# Patient Record
Sex: MALE | Race: WHITE | NOT HISPANIC OR LATINO | URBAN - METROPOLITAN AREA
[De-identification: names, ages, dates, MRNs, and addresses within clinical notes are randomized per-mention and may not be internally consistent; named-entity substitution may affect disease eponyms.]

---

## 2017-11-25 ENCOUNTER — EMERGENCY (EMERGENCY)
Facility: HOSPITAL | Age: 22
LOS: 1 days | Discharge: HOME | End: 2017-11-25
Attending: EMERGENCY MEDICINE | Admitting: EMERGENCY MEDICINE
Payer: COMMERCIAL

## 2017-11-25 VITALS
TEMPERATURE: 97 F | OXYGEN SATURATION: 100 % | RESPIRATION RATE: 16 BRPM | HEART RATE: 82 BPM | SYSTOLIC BLOOD PRESSURE: 141 MMHG | DIASTOLIC BLOOD PRESSURE: 90 MMHG

## 2017-11-25 LAB
HIV1 AG SER QL: SIGNIFICANT CHANGE UP
HIV1+2 AB SPEC QL: SIGNIFICANT CHANGE UP

## 2017-11-25 PROCEDURE — 99284 EMERGENCY DEPT VISIT MOD MDM: CPT

## 2017-11-25 NOTE — ED PROVIDER NOTE - PENIS
LESIONS/Uncircumcised, 2 small lesions on the L side of the penis. Appear to be small papules with slight scabbing on each. Minimal tenderness. No other lesions. No inguinal lymphadenopathy. Testicular exam normal. No discharge noted./uncircumcised LESIONS/circumcised, 2 small lesions on the L side of the penis. Appear to be small papules with slight scabbing on each. Minimal tenderness. No other lesions. No inguinal lymphadenopathy. Testicular exam normal. No discharge noted./circumcised

## 2017-11-25 NOTE — ED PROVIDER NOTE - OBJECTIVE STATEMENT
21y/o M with no pertinent PMHx presents to the ED c/o scabs to the penis, rhinorrhea x2d. Pt found what resembles blisters this morning on his penis. He then showered, they then appeared as scabs. He visited an urgi center and they swabbed the blisters, which were painful at the time. Pt was not told anything at urgi, and presents to the ED today. He has not had an STD in the past. Pt has not had a sexual encounter in 4mo. He reports all instances of past sexual intercourse have been protected. Denies discharge, dysuria, burning urination, injury, any other complaints. 23y/o M with no pertinent PMHx presents to the ED c/o scabs to the penis, rhinorrhea x2d. Pt found what resembled small lumps maybe pimples this morning on his penis. He then showered, they then appeared as scabs. He visited an urgi center and they swabbed the blisters, which were painful at the time. He was told to go to ER for STD testing. He has not had an STD in the past. Pt states has not had a sexual encounter in 4mo. He reports all instances of past sexual intercourse have been protected. Denies discharge, dysuria, burning urination, injury, any other complaints.

## 2017-11-25 NOTE — ED PROVIDER NOTE - ATTENDING CONTRIBUTION TO CARE
(Dr Dozier:) I performed the initial face to face bedside interview with this patient regarding history of present illness, review of symptoms and past medical, social history.  I completed an independent physical examination.  I was the initial provider who evaluated this patient.  The history, review of symptoms and examination was documented by the scribe in my presence and I attest to the accuracy of the documentation.  I have signed out the follow up of any pending tests ( if indicated, such as labs, radiological studies) to the PA.  I have discussed the patient’s plan of care and disposition with the PA.

## 2017-11-25 NOTE — ED PROVIDER NOTE - PLAN OF CARE
Follow up with your Primary Medical Doctor within 2-3days. Call [phone] for your results. If results or reports were given to you, show copies of your reports given to you. Take all of your medications as previously prescribed. If any new or concerning symptoms return to the ED.

## 2017-11-25 NOTE — ED PROVIDER NOTE - PROGRESS NOTE DETAILS
MAIRUM Abreu: Received signout and discussed plan with QUID attending. Will give patient number to call for results. Stable for d/c home with followup. Pt amenable with plan.

## 2017-11-25 NOTE — ED PROVIDER NOTE - MEDICAL DECISION MAKING DETAILS
23y/o M with 2 skin lesions on the penis. Unlikely STD based on history and appearance. Will send HIV, VDRL, urine for GC chlamydia, and herpes swab if available. 23y/o M with 2 skin lesions on the penis. Unlikely STD based on history and appearance. Will send HIV, VDRL, urine for GC chlamydia, and herpes viral swab. Will not treat with antibiotics or antivirals based on low likelihood of STD based on history and exam.

## 2017-11-25 NOTE — ED PROVIDER NOTE - SKIN, MLM
Except for lesions on penis as described, Skin normal color for race, warm, dry and intact. No evidence of rash.

## 2017-11-25 NOTE — ED PROVIDER NOTE - CARE PLAN
Principal Discharge DX:	Penile lesion  Instructions for follow-up, activity and diet:	Follow up with your Primary Medical Doctor within 2-3days. Call [phone] for your results. If results or reports were given to you, show copies of your reports given to you. Take all of your medications as previously prescribed. If any new or concerning symptoms return to the ED.

## 2017-11-26 LAB
C TRACH RRNA SPEC QL NAA+PROBE: SIGNIFICANT CHANGE UP
HSV+VZV DNA SPEC QL NAA+PROBE: SIGNIFICANT CHANGE UP
N GONORRHOEA RRNA SPEC QL NAA+PROBE: SIGNIFICANT CHANGE UP
SPECIMEN SOURCE: SIGNIFICANT CHANGE UP
SPECIMEN SOURCE: SIGNIFICANT CHANGE UP
T PALLIDUM AB TITR SER: NEGATIVE — SIGNIFICANT CHANGE UP

## 2017-11-26 NOTE — ED POST DISCHARGE NOTE - RESULT SUMMARY
Patient called for results. Discussed with patient HSV2 detected. Patient states no pain presently. Patient told to follow up with Urologist.

## 2019-04-26 PROBLEM — Z00.00 ENCOUNTER FOR PREVENTIVE HEALTH EXAMINATION: Status: ACTIVE | Noted: 2019-04-26

## 2019-05-17 ENCOUNTER — APPOINTMENT (OUTPATIENT)
Dept: GASTROENTEROLOGY | Facility: CLINIC | Age: 24
End: 2019-05-17

## 2020-05-05 ENCOUNTER — TRANSCRIPTION ENCOUNTER (OUTPATIENT)
Age: 25
End: 2020-05-05

## 2020-05-05 ENCOUNTER — APPOINTMENT (OUTPATIENT)
Dept: INTERNAL MEDICINE | Facility: CLINIC | Age: 25
End: 2020-05-05
Payer: COMMERCIAL

## 2020-05-05 ENCOUNTER — EMERGENCY (EMERGENCY)
Facility: HOSPITAL | Age: 25
LOS: 1 days | Discharge: HOME | End: 2020-05-05
Attending: EMERGENCY MEDICINE
Payer: COMMERCIAL

## 2020-05-05 VITALS
DIASTOLIC BLOOD PRESSURE: 88 MMHG | HEART RATE: 79 BPM | RESPIRATION RATE: 18 BRPM | SYSTOLIC BLOOD PRESSURE: 126 MMHG | HEIGHT: 65 IN | TEMPERATURE: 99 F | WEIGHT: 139.99 LBS | OXYGEN SATURATION: 100 %

## 2020-05-05 VITALS
SYSTOLIC BLOOD PRESSURE: 122 MMHG | OXYGEN SATURATION: 99 % | HEART RATE: 77 BPM | TEMPERATURE: 99 F | RESPIRATION RATE: 18 BRPM | DIASTOLIC BLOOD PRESSURE: 77 MMHG

## 2020-05-05 DIAGNOSIS — R50.9 FEVER, UNSPECIFIED: ICD-10-CM

## 2020-05-05 DIAGNOSIS — F12.90 CANNABIS USE, UNSPECIFIED, UNCOMPLICATED: ICD-10-CM

## 2020-05-05 DIAGNOSIS — Z72.0 TOBACCO USE: ICD-10-CM

## 2020-05-05 DIAGNOSIS — Z87.898 PERSONAL HISTORY OF OTHER SPECIFIED CONDITIONS: ICD-10-CM

## 2020-05-05 DIAGNOSIS — Z87.891 PERSONAL HISTORY OF NICOTINE DEPENDENCE: ICD-10-CM

## 2020-05-05 DIAGNOSIS — K29.00 ACUTE GASTRITIS W/OUT BLEEDING: ICD-10-CM

## 2020-05-05 LAB
ALBUMIN SERPL ELPH-MCNC: 3.9 G/DL — SIGNIFICANT CHANGE UP (ref 3.3–5)
ALP SERPL-CCNC: 70 U/L — SIGNIFICANT CHANGE UP (ref 40–120)
ALT FLD-CCNC: 11 U/L — SIGNIFICANT CHANGE UP (ref 10–45)
ANION GAP SERPL CALC-SCNC: 20 MMOL/L — HIGH (ref 5–17)
APPEARANCE UR: CLEAR — SIGNIFICANT CHANGE UP
AST SERPL-CCNC: 17 U/L — SIGNIFICANT CHANGE UP (ref 10–40)
BASE EXCESS BLDV CALC-SCNC: 2.9 MMOL/L — HIGH (ref -2–2)
BASOPHILS # BLD AUTO: 0.02 K/UL — SIGNIFICANT CHANGE UP (ref 0–0.2)
BASOPHILS NFR BLD AUTO: 0.1 % — SIGNIFICANT CHANGE UP (ref 0–2)
BILIRUB SERPL-MCNC: 1.4 MG/DL — HIGH (ref 0.2–1.2)
BILIRUB UR-MCNC: ABNORMAL
BUN SERPL-MCNC: 14 MG/DL — SIGNIFICANT CHANGE UP (ref 7–23)
CA-I SERPL-SCNC: 1.15 MMOL/L — SIGNIFICANT CHANGE UP (ref 1.12–1.3)
CALCIUM SERPL-MCNC: 9.6 MG/DL — SIGNIFICANT CHANGE UP (ref 8.4–10.5)
CHLORIDE BLDV-SCNC: 99 MMOL/L — SIGNIFICANT CHANGE UP (ref 96–108)
CHLORIDE SERPL-SCNC: 95 MMOL/L — LOW (ref 96–108)
CO2 BLDV-SCNC: 29 MMOL/L — SIGNIFICANT CHANGE UP (ref 22–30)
CO2 SERPL-SCNC: 23 MMOL/L — SIGNIFICANT CHANGE UP (ref 22–31)
COLOR SPEC: YELLOW — SIGNIFICANT CHANGE UP
CREAT SERPL-MCNC: 1.01 MG/DL — SIGNIFICANT CHANGE UP (ref 0.5–1.3)
DIFF PNL FLD: NEGATIVE — SIGNIFICANT CHANGE UP
EOSINOPHIL # BLD AUTO: 0 K/UL — SIGNIFICANT CHANGE UP (ref 0–0.5)
EOSINOPHIL NFR BLD AUTO: 0 % — SIGNIFICANT CHANGE UP (ref 0–6)
GAS PNL BLDV: 134 MMOL/L — LOW (ref 135–145)
GAS PNL BLDV: SIGNIFICANT CHANGE UP
GLUCOSE BLDV-MCNC: 101 MG/DL — HIGH (ref 70–99)
GLUCOSE SERPL-MCNC: 105 MG/DL — HIGH (ref 70–99)
GLUCOSE UR QL: NEGATIVE — SIGNIFICANT CHANGE UP
HCO3 BLDV-SCNC: 27 MMOL/L — SIGNIFICANT CHANGE UP (ref 21–29)
HCT VFR BLD CALC: 37.8 % — LOW (ref 39–50)
HCT VFR BLDA CALC: 42 % — SIGNIFICANT CHANGE UP (ref 39–50)
HGB BLD CALC-MCNC: 13.8 G/DL — SIGNIFICANT CHANGE UP (ref 13–17)
HGB BLD-MCNC: 13.1 G/DL — SIGNIFICANT CHANGE UP (ref 13–17)
IMM GRANULOCYTES NFR BLD AUTO: 0.5 % — SIGNIFICANT CHANGE UP (ref 0–1.5)
KETONES UR-MCNC: ABNORMAL
LACTATE BLDV-MCNC: 1.6 MMOL/L — SIGNIFICANT CHANGE UP (ref 0.7–2)
LEUKOCYTE ESTERASE UR-ACNC: NEGATIVE — SIGNIFICANT CHANGE UP
LIDOCAIN IGE QN: 8 U/L — SIGNIFICANT CHANGE UP (ref 7–60)
LYMPHOCYTES # BLD AUTO: 1.05 K/UL — SIGNIFICANT CHANGE UP (ref 1–3.3)
LYMPHOCYTES # BLD AUTO: 6.7 % — LOW (ref 13–44)
MCHC RBC-ENTMCNC: 29 PG — SIGNIFICANT CHANGE UP (ref 27–34)
MCHC RBC-ENTMCNC: 34.7 GM/DL — SIGNIFICANT CHANGE UP (ref 32–36)
MCV RBC AUTO: 83.8 FL — SIGNIFICANT CHANGE UP (ref 80–100)
MONOCYTES # BLD AUTO: 0.38 K/UL — SIGNIFICANT CHANGE UP (ref 0–0.9)
MONOCYTES NFR BLD AUTO: 2.4 % — SIGNIFICANT CHANGE UP (ref 2–14)
NEUTROPHILS # BLD AUTO: 14.22 K/UL — HIGH (ref 1.8–7.4)
NEUTROPHILS NFR BLD AUTO: 90.3 % — HIGH (ref 43–77)
NITRITE UR-MCNC: NEGATIVE — SIGNIFICANT CHANGE UP
NRBC # BLD: 0 /100 WBCS — SIGNIFICANT CHANGE UP (ref 0–0)
PCO2 BLDV: 44 MMHG — SIGNIFICANT CHANGE UP (ref 35–50)
PH BLDV: 7.41 — SIGNIFICANT CHANGE UP (ref 7.35–7.45)
PH UR: 6.5 — SIGNIFICANT CHANGE UP (ref 5–8)
PLATELET # BLD AUTO: 422 K/UL — HIGH (ref 150–400)
PO2 BLDV: 40 MMHG — SIGNIFICANT CHANGE UP (ref 25–45)
POTASSIUM BLDV-SCNC: 3.6 MMOL/L — SIGNIFICANT CHANGE UP (ref 3.5–5.3)
POTASSIUM SERPL-MCNC: 3.8 MMOL/L — SIGNIFICANT CHANGE UP (ref 3.5–5.3)
POTASSIUM SERPL-SCNC: 3.8 MMOL/L — SIGNIFICANT CHANGE UP (ref 3.5–5.3)
PROT SERPL-MCNC: 8.3 G/DL — SIGNIFICANT CHANGE UP (ref 6–8.3)
PROT UR-MCNC: 100 — SIGNIFICANT CHANGE UP
RBC # BLD: 4.51 M/UL — SIGNIFICANT CHANGE UP (ref 4.2–5.8)
RBC # FLD: 11.1 % — SIGNIFICANT CHANGE UP (ref 10.3–14.5)
SAO2 % BLDV: 72 % — SIGNIFICANT CHANGE UP (ref 67–88)
SODIUM SERPL-SCNC: 138 MMOL/L — SIGNIFICANT CHANGE UP (ref 135–145)
SP GR SPEC: 1.04 — HIGH (ref 1.01–1.02)
UROBILINOGEN FLD QL: ABNORMAL
WBC # BLD: 15.75 K/UL — HIGH (ref 3.8–10.5)
WBC # FLD AUTO: 15.75 K/UL — HIGH (ref 3.8–10.5)

## 2020-05-05 PROCEDURE — 85027 COMPLETE CBC AUTOMATED: CPT

## 2020-05-05 PROCEDURE — 85014 HEMATOCRIT: CPT

## 2020-05-05 PROCEDURE — 82947 ASSAY GLUCOSE BLOOD QUANT: CPT

## 2020-05-05 PROCEDURE — 84132 ASSAY OF SERUM POTASSIUM: CPT

## 2020-05-05 PROCEDURE — 82330 ASSAY OF CALCIUM: CPT

## 2020-05-05 PROCEDURE — 82435 ASSAY OF BLOOD CHLORIDE: CPT

## 2020-05-05 PROCEDURE — 99284 EMERGENCY DEPT VISIT MOD MDM: CPT | Mod: 25

## 2020-05-05 PROCEDURE — 83690 ASSAY OF LIPASE: CPT

## 2020-05-05 PROCEDURE — 71045 X-RAY EXAM CHEST 1 VIEW: CPT | Mod: 26

## 2020-05-05 PROCEDURE — 82803 BLOOD GASES ANY COMBINATION: CPT

## 2020-05-05 PROCEDURE — 99204 OFFICE O/P NEW MOD 45 MIN: CPT | Mod: 95

## 2020-05-05 PROCEDURE — 99284 EMERGENCY DEPT VISIT MOD MDM: CPT

## 2020-05-05 PROCEDURE — 96374 THER/PROPH/DIAG INJ IV PUSH: CPT

## 2020-05-05 PROCEDURE — 96361 HYDRATE IV INFUSION ADD-ON: CPT

## 2020-05-05 PROCEDURE — 80053 COMPREHEN METABOLIC PANEL: CPT

## 2020-05-05 PROCEDURE — 71045 X-RAY EXAM CHEST 1 VIEW: CPT

## 2020-05-05 PROCEDURE — 84295 ASSAY OF SERUM SODIUM: CPT

## 2020-05-05 PROCEDURE — 83605 ASSAY OF LACTIC ACID: CPT

## 2020-05-05 RX ORDER — SODIUM CHLORIDE 9 MG/ML
1000 INJECTION INTRAMUSCULAR; INTRAVENOUS; SUBCUTANEOUS ONCE
Refills: 0 | Status: COMPLETED | OUTPATIENT
Start: 2020-05-05 | End: 2020-05-05

## 2020-05-05 RX ORDER — ONDANSETRON 8 MG/1
4 TABLET, FILM COATED ORAL ONCE
Refills: 0 | Status: COMPLETED | OUTPATIENT
Start: 2020-05-05 | End: 2020-05-05

## 2020-05-05 RX ORDER — FAMOTIDINE 10 MG/ML
20 INJECTION INTRAVENOUS ONCE
Refills: 0 | Status: COMPLETED | OUTPATIENT
Start: 2020-05-05 | End: 2020-05-05

## 2020-05-05 RX ORDER — AZITHROMYCIN 500 MG/1
1 TABLET, FILM COATED ORAL
Qty: 4 | Refills: 0
Start: 2020-05-05 | End: 2020-05-08

## 2020-05-05 RX ORDER — ONDANSETRON 8 MG/1
1 TABLET, FILM COATED ORAL
Qty: 12 | Refills: 0
Start: 2020-05-05 | End: 2020-05-08

## 2020-05-05 RX ORDER — FAMOTIDINE 10 MG/ML
20 INJECTION INTRAVENOUS DAILY
Refills: 0 | Status: DISCONTINUED | OUTPATIENT
Start: 2020-05-05 | End: 2020-05-05

## 2020-05-05 RX ORDER — AZITHROMYCIN 500 MG/1
500 TABLET, FILM COATED ORAL ONCE
Refills: 0 | Status: COMPLETED | OUTPATIENT
Start: 2020-05-05 | End: 2020-05-05

## 2020-05-05 RX ADMIN — SODIUM CHLORIDE 1000 MILLILITER(S): 9 INJECTION INTRAMUSCULAR; INTRAVENOUS; SUBCUTANEOUS at 21:17

## 2020-05-05 RX ADMIN — FAMOTIDINE 20 MILLIGRAM(S): 10 INJECTION INTRAVENOUS at 21:18

## 2020-05-05 RX ADMIN — AZITHROMYCIN 500 MILLIGRAM(S): 500 TABLET, FILM COATED ORAL at 21:43

## 2020-05-05 RX ADMIN — SODIUM CHLORIDE 1000 MILLILITER(S): 9 INJECTION INTRAMUSCULAR; INTRAVENOUS; SUBCUTANEOUS at 22:55

## 2020-05-05 RX ADMIN — SODIUM CHLORIDE 1000 MILLILITER(S): 9 INJECTION INTRAMUSCULAR; INTRAVENOUS; SUBCUTANEOUS at 19:54

## 2020-05-05 RX ADMIN — ONDANSETRON 4 MILLIGRAM(S): 8 TABLET, FILM COATED ORAL at 22:55

## 2020-05-05 NOTE — ED PROVIDER NOTE - CARE PLAN
Principal Discharge DX:	Suspected 2019 novel coronavirus infection  Secondary Diagnosis:	Nausea and vomiting

## 2020-05-05 NOTE — HEALTH RISK ASSESSMENT
[] : Yes [2 - 4 times a month (2 pts)] : 2-4 times a month (2 points) [5 or 6 (2 pts)] : 5 or 6 (2  points) [Monthly (2 pts)] : Monthly (2 points) [No falls in past year] : Patient reported no falls in the past year [Audit-CScore] : 6 [de-identified] : vapes cigarettes

## 2020-05-05 NOTE — ED PROVIDER NOTE - PHYSICAL EXAMINATION
CONSTITUTIONAL: Patient is awake, alert and oriented x 3. Patient is well appearing and in no acute distress.  NECK: supple, FROM  LUNGS: CTA B/L,  HEART: RRR.+S1S2 no murmurs,   ABDOMEN: Soft nd/nt+bs no rebound or guarding.   EXTREMITY: no edema or calf tenderness b/l, FROM upper and lower ext b/l  NEURO: No focal deficits

## 2020-05-05 NOTE — ED PROVIDER NOTE - ATTENDING CONTRIBUTION TO CARE
Dr Brown Note: 25 yo M sent in by GI after tele visit   Pt has hx of "stomach issues"  had normal endoscopy 1 year ago  Now states 1 week ago he had constipation, took laxative, since then has had both episodes of loose and more formed stool  with epigastric pain daily, 3 days ago had fever 102, seen at  2 days ago covid sent (awaiting results)   Vomiting started 2 days ago, has not been constant   pt has not taken anything for his symptoms     Pt had heavy drinking (whisky) day before symptoms started 1 week ago, not since  Mild increase in daily marijuana use   Also smokes a Juul    Pt denies any cp, sob, cough, rash, known sick contacts     Gen: no acute distress non toxic alert and coherent, no cyanosis   HEENT: atraumatic,  no scleral icterus  EOMI, dry mucous membranes   Neck: no midline tenderness, supple  Lungs: Air entry good, clear to auscultation and percussion   CVS: reg HR S1/S2 no murmur no gallop   ABD: +BS in all 4 quadrants, soft, non tender,  non distended, non palpable liver and spleen and no other masses. no hernias.  Extremities: No deformities, no edema, no calf tenderness  Neuro: AA and Ox3, CNII-XII grossly intact     Abdomen soft and nontender, appears dry on mm   Will check electrolytes and hydrate, will check lipase for possible pancreatitis   Could also be COVID- (outpatient test pending), However pt with no resp symptoms or cardiac symptoms and now afebrile     No indication for abdominal imaging at this time- if exam changes and or abnormal labs will reconsider image     re eval   dispo pending Dr Brown Note: 25 yo M sent in by GI after tele visit   Pt has hx of "stomach issues"  had normal endoscopy 1 year ago  Now states 1 week ago he had constipation, took laxative, since then has had both episodes of loose and more formed stool  with epigastric pain daily, 3 days ago had fever 102, seen at  2 days ago Covid sent (awaiting results)   Vomiting started 2 days ago, has not been constant   pt has not taken anything for his symptoms     Pt had heavy drinking (whisky) day before symptoms started 1 week ago, not since  Mild increase in daily marijuana use   Also smokes a Juul    Pt denies any cp, sob, cough, rash, known sick contacts     Gen: no acute distress non toxic alert and coherent, no cyanosis   HEENT: atraumatic,  no scleral icterus  EOMI, dry mucous membranes   Neck: no midline tenderness, supple  Lungs: Air entry good, clear to auscultation and percussion   CVS: reg HR S1/S2 no murmur no gallop   ABD: +BS in all 4 quadrants, soft, non tender,  non distended, non palpable liver and spleen and no other masses. no hernias.  Extremities: No deformities, no edema, no calf tenderness  Neuro: AA and Ox3, CNII-XII grossly intact     Abdomen soft and nontender, appears dry on mm   Will check electrolytes and hydrate, will check lipase for possible pancreatitis   Could also be COVID- (outpatient test pending), However pt with no resp symptoms or cardiac symptoms and now afebrile     No indication for abdominal imaging at this time- if exam changes and or abnormal labs will reconsider image     re eval   dispo pending

## 2020-05-05 NOTE — ED PROVIDER NOTE - NSFOLLOWUPINSTRUCTIONS_ED_ALL_ED_FT
You were evaluated in the Emergency Department for nausea and vomiting.     At this time, you do not meet our hospital's criteria for coronavirus testing and we are unable to test you specifically for the COVID-19 virus that is causing many infections around the world; however, we still recommend that you stay home and self-quarantine (avoid contact with other people) for 2 weeks from onset of symptoms     We recommend that you:  1. Take azithromycin 250mg daily as prescribed for the next 4 days. You may use Zofran 1 tab every 8 hours as needed for nausea and vomiting. Prescriptions sent to pharmacy.    2. Take Tylenol, 2 extra strength tablets, up to every 6 hours as needed for pain or any fever.  3. Drink plenty of fluids.  4. Call your primary care doctor tomorrow for follow-up, we have provided a copy of results for follow up.   5. AVOID CONTACT WITH OTHERS AND SELF-QUARANTINE FOR THE NEXT 2 WEEKS     *** Return immediately (or call 911) if you have increased difficulty breathing, weakness, worsened nausea or vomiting, chest pain, dizziness or develop other new/concerning symptoms. ***    What is a coronavirus?  Coronaviruses are a large family of viruses that cause illnesses ranging from the common cold to more severe diseases such as Middle East Respiratory Syndrome (MERS) and Severe Acute Respiratory Syndrome (SARS).    What is Novel Coronavirus (COVID-19)?  The Centers for Disease Control and Prevention (CDC) is closely monitoring the outbreak caused by COVID-19. For the latest information about COVID-19, visit the CDC website at CDC.gov/Coronavirus    How are coronaviruses spread?  Coronaviruses can be transmitted from person to person, usually after close contact with an infected  person (for example, in a household, workplace, or healthcare setting), via droplets that become airborne after a cough or sneeze. These droplets can then infect a nearby person. Transmission can also occur by touching recently contaminated surfaces.    Is there a treatment for a COVID-19?  There is no specific treatment for disease caused by COVID-19. However, many of the symptoms can be treated based on the patient’s clinical condition. Supportive care for infected persons can be highly effective.    What are the symptoms of coronavirus infection?  It depends on the virus, but common signs include fever and/or respiratory symptoms such as cough and shortness of breath. In more severe cases, infection can cause pneumonia, severe acute respiratory syndrome, kidney failure and even death. Fortunately, most cases of COVID-19 have an illness no different than the influenza (flu), with a majority of these patients having mild symptoms and overall mortality which appears to be not much different than the flu.    What can I do to protect myself?  The best precautionary measures:  – washing your hands  – covering your cough  – disinfecting surfaces  – it is also advisable to avoid close contact with anyone showing symptoms of respiratory illness such as coughing and sneezing  – those with symptoms should wear a surgical mask when around others    What can I do to protect those around me?  If you have been identified as someone who may be infected with COVID-19, we recommend you follow the self-isolation procedures outlined on the following page to protect those around you and to limit the spread of this virus.    We recommend the below precautionary steps from now until 14 days from when you returned from your travel or date of your last known possible contact:    — Do not go to work, school or public areas. Avoid using public transportation, ridesharing or taxis.  — As much as possible, separate yourself from other people in your home. If you can, you should stay in a room and away from other people. Also, you should use a separate bathroom if available.  — Wear the supplied mask whenever you are around other people.  — If you have a non-urgent medical appointment, please reschedule for a later date. If the appointment is urgent, please call the health care provider and tell them that you are on self-isolation for possible COVID-19. This will help the health care provider’s office take steps to keep other people from getting infected or exposed. If you can reschedule routine appointments, do so.  — Wash your hands often with soap and water for at least 15 to 20 seconds or clean your hands with an alcohol-based hand  that contains 60 to 95% alcohol, covering all surfaces of your hands and rubbing them together until they feel dry. Soap and water should be used preferentially if hands are visibly dirty.  — Cover your mouth and nose with a tissue when you cough or sneeze. Throw used tissues in a lined trash can. Immediately wash your hands.  — Avoid touching your eyes, nose, and mouth with your hands.  — Avoid sharing personal household items. You should not share dishes, drinking glasses, cups, eating utensils, towels, or bedding with other people or pets in your home. After using these items, they should be washed thoroughly with soap and water.  — Clean and disinfect all “high-touch” surfaces every day. High touch surfaces include counters, tabletops, doorknobs, light switches, remote controls, bathroom fixtures, toilets, phones, keyboards, tablets, and bedside tables. Also, clean any surfaces that may have blood, stool, or body fluids on them.

## 2020-05-05 NOTE — ED ADULT NURSE NOTE - OBJECTIVE STATEMENT
24 y M aaox4 ambulatory from home, presents to Ed c/o abdominal pain, As per pt the pain started last Saturday, LUQ , N/V  and Diarrhea for about 1 week, went to  Sunday , swapped for Covid no results upon to arriving ED,  also c/o fever (102.4 F), chills, lightheaded. Pt denies CP, SOB, HA, vision changes, weakness, Gu issues. as per pt states he is marihuana user.  Safety and comfort measures initiated- bed placed in lowest position and side rails raised. Pt oriented to call bell system.

## 2020-05-05 NOTE — ED PROVIDER NOTE - PATIENT PORTAL LINK FT
You can access the FollowMyHealth Patient Portal offered by [facility] by registering at the following website: [email or web address]. By joining [facility]’s FollowMyHealth portal, you will also be able to view your health information using other applications (apps) compatible with our system.

## 2020-05-05 NOTE — ED PROVIDER NOTE - PROGRESS NOTE DETAILS
Pt feeling improvement w/ ivf in ED. Labs sig for slight leukocytosis 15. Mild anion gap 20, bili 1.4. + Ketones in urine. On CXR left mid lung and lower lung opacity. Possible Covid w/ predominant GI symptoms. Pt endorse mild cough and fever for 1 day. Given unilateral pna will prescribe azithro and d/c home with zofran odt for nausea   Fabiana Corea PA-C

## 2020-05-05 NOTE — ED PROVIDER NOTE - OBJECTIVE STATEMENT
24y M w/ no significant PMHx presents after being recommended by gastroenterologist to come for labs and fluids for having consistent N/V for the past week, along w/ episodes of constipation. Pt has not been able to tolerate any PO intake. Reports he started vomiting 3d ago. Has had intermittent diarrhea and some mild L epigastric pain. Endorses having had stomach issues before, but not to this extent. Pt also reports having had a fever Tmax 102 2d ago, which has since resovled. Denies any urinary sx. No sick contacts. Vapes, occasional marijuana smoker, and occasional alcohol use. 24y M w/ no significant PMHx presents after being recommended by gastroenterologist to come for labs and fluids for having consistent N/V for the past week, along w/ episodes of constipation. Pt has not been able to tolerate any PO intake. Reports he started vomiting 3d ago. Has had intermittent diarrhea and some mild L epigastric pain. Endorses having had stomach issues before, but not to this extent. Pt also reports having had a fever Tmax 102 2d ago, which has since resovled.  No sick contacts. Vapes, occasional marijuana smoker, and occasional alcohol use. Denies HA, dizziness, chest pain, sob, urinar symptoms

## 2020-05-05 NOTE — HISTORY OF PRESENT ILLNESS
[FreeTextEntry8] : 5:06-6pm\par Working from home. Called to evaluate GI symptoms x past 1 week\par Reports 1week ago, constipation and took laxative-watery stool.\par Since then, no appettie, small  portions with watery stool\par By Saturday, increasse nause and vomitig\par [place] [facility] [place], tested for COVID-pending, no resp complaints\par Has documented daily fevers up to 101- 102,  going down to 100.4 in past 24 hours\par Wake up am puking bile since Sunday, no real food and not much fluid as he vomits up anthiing he takes in\par Blood early today  in bilious vomitus. No hx stomach PUD but does have hx of dyspepsia/IBS? with EGD done few teagan ago.\par Has been nausea and vomintign daily (yesterday Monday -vomited twice-unable to keep down food-including crackers)\par Stools are brown, not skip color\par Urine output small and dark\par Fever 100.2 yesterday\par No cough x when puking\par Drinks 6 whiskeys all at once every other weekend, last took if over 1 week ago, before these GI symptoms began\par Vapes nicotine

## 2020-05-06 ENCOUNTER — APPOINTMENT (OUTPATIENT)
Dept: INTERNAL MEDICINE | Facility: CLINIC | Age: 25
End: 2020-05-06
Payer: COMMERCIAL

## 2020-05-06 PROCEDURE — 99214 OFFICE O/P EST MOD 30 MIN: CPT | Mod: 95

## 2020-05-07 ENCOUNTER — APPOINTMENT (OUTPATIENT)
Dept: INTERNAL MEDICINE | Facility: CLINIC | Age: 25
End: 2020-05-07
Payer: COMMERCIAL

## 2020-05-07 DIAGNOSIS — K22.10 ULCER OF ESOPHAGUS W/OUT BLEEDING: ICD-10-CM

## 2020-05-07 DIAGNOSIS — E86.0 DEHYDRATION: ICD-10-CM

## 2020-05-07 PROCEDURE — 99215 OFFICE O/P EST HI 40 MIN: CPT | Mod: 95

## 2020-05-08 ENCOUNTER — APPOINTMENT (OUTPATIENT)
Dept: INTERNAL MEDICINE | Facility: CLINIC | Age: 25
End: 2020-05-08
Payer: COMMERCIAL

## 2020-05-08 PROBLEM — K22.10 EROSIVE ESOPHAGITIS: Status: ACTIVE | Noted: 2020-05-07

## 2020-05-08 PROBLEM — E86.0 DEHYDRATION: Status: ACTIVE | Noted: 2020-05-05

## 2020-05-08 PROCEDURE — 99214 OFFICE O/P EST MOD 30 MIN: CPT | Mod: 95

## 2020-05-08 NOTE — HISTORY OF PRESENT ILLNESS
[Medical Office: ([facility])___] : at the medical office located in  [Home] : at home, [unfilled] , at the time of the visit. [Other:____] : [unfilled] [Patient] : the patient [Self] : self [Time Spent: ___ minutes] : I have spent [unfilled] minutes with the patient on the telephone [FreeTextEntry1] : Received message  from aunt ,making call for Brendan say that he wanted call back because he was still coughing with chest tightness.   Using warm steam and albuterol inhaler, O2 sat 95% on RA.\par \par Spoke to Brendan, who states he coughed up some scant dark blood during one of coughing spasms this morning. He was sent to ER by me May 5, 2 days ago for severe dehydration after being febrile and having diarrhea x a week. He had received 2 L NS in ER. Urine output now slightly larger but not as contrated.  He still feels weak and not able to take larger amounts of water.  Stomach still  bothering him, took Zofran but no Famotidine 20mg yet as none available in pharmacy. Unable to get a personal electric  steam heater yet, as noone in stock. till febrile to Tmax 100.\par \par Spoke to Lucia, aunt on phone with Pedrito permission, as he gave her cell phone to me. She states he  is living with her as he moved back to [place] to complete college and work, in order to save money. parents are in [facility].  He does not eat nutritiously and confirms he drinks 6 whiskies on weekends with his friends. She agrees to help him and will give him soups/BRAT diet including congee.

## 2020-05-11 NOTE — HISTORY OF PRESENT ILLNESS
[Medical Office: ([facility])___] : at the medical office located in  [Home] : at home, [unfilled] , at the time of the visit. [Patient] : the patient [Self] : self [Time Spent: ___ minutes] : I have spent [unfilled] minutes with the patient on the telephone [FreeTextEntry1] : 10:00am\par Looks better and feels better.\par \par Suspct COVID with PNA on CXR\par Tmax \par Completing Zpack from the ER on 5/5\par Woke up after taking famotidine 40mg twice begun yesterday and did not feel nausea\par Also started BRAT diet yesterday with congee and no more diarrhea\par Doing warm steam with albuterol inhaler/spacer device -still feels it hard to take a deep breath in completely but pulse ox 94-95.\par To continue hydration, advance diet as tolerated slowly, avoid dairy -no diarrhea for about 24 hour

## 2020-05-15 ENCOUNTER — TRANSCRIPTION ENCOUNTER (OUTPATIENT)
Age: 25
End: 2020-05-15

## 2020-05-17 RX ORDER — OMEPRAZOLE 40 MG/1
40 CAPSULE, DELAYED RELEASE ORAL
Qty: 30 | Refills: 1 | Status: DISCONTINUED | COMMUNITY
Start: 2020-05-07 | End: 2020-05-17

## 2020-05-17 RX ORDER — FAMOTIDINE 40 MG/1
40 TABLET, FILM COATED ORAL TWICE DAILY
Qty: 60 | Refills: 0 | Status: DISCONTINUED | COMMUNITY
Start: 2020-05-07 | End: 2020-05-17

## 2020-05-17 RX ORDER — ONDANSETRON 4 MG/1
4 TABLET ORAL
Qty: 120 | Refills: 1 | Status: DISCONTINUED | COMMUNITY
Start: 2020-05-06 | End: 2020-05-17

## 2020-05-18 ENCOUNTER — TRANSCRIPTION ENCOUNTER (OUTPATIENT)
Age: 25
End: 2020-05-18

## 2020-05-18 NOTE — HISTORY OF PRESENT ILLNESS
[Home] : at home, [unfilled] , at the time of the visit. [Medical Office: ([facility])___] : at the medical office located in  [Patient] : the patient [Self] : self [Time Spent: ___ minutes] : I have spent [unfilled] minutes with the patient on the telephone [FreeTextEntry1] : Seen in ER

## 2020-05-18 NOTE — HISTORY OF PRESENT ILLNESS
[Home] : at home, [unfilled] , at the time of the visit. [Medical Office: ([facility])___] : at the medical office located in  [Patient] : the patient [Self] : self [Time Spent: ___ minutes] : I have spent [unfilled] minutes with the patient on the telephone [FreeTextEntry1] : Seen in ER

## 2020-05-21 ENCOUNTER — APPOINTMENT (OUTPATIENT)
Dept: INTERNAL MEDICINE | Facility: CLINIC | Age: 25
End: 2020-05-21
Payer: COMMERCIAL

## 2020-05-21 DIAGNOSIS — Z20.828 CONTACT WITH AND (SUSPECTED) EXPOSURE TO OTHER VIRAL COMMUNICABLE DISEASES: ICD-10-CM

## 2020-05-21 PROCEDURE — 99496 TRANSJ CARE MGMT HIGH F2F 7D: CPT | Mod: 95

## 2020-05-22 ENCOUNTER — TRANSCRIPTION ENCOUNTER (OUTPATIENT)
Age: 25
End: 2020-05-22

## 2020-05-24 PROBLEM — Z20.828 SUSPECTED COVID-19 VIRUS INFECTION: Status: ACTIVE | Noted: 2020-05-06

## 2020-05-24 NOTE — REVIEW OF SYSTEMS
[Fever] : no fever [Shortness Of Breath] : no shortness of breath [Abdominal Pain] : no abdominal pain

## 2020-05-24 NOTE — HEALTH RISK ASSESSMENT
[Monthly or less (1 pt)] : Monthly or less (1 point) [Never (0 pts)] : Never (0 points) [No] : In the past 12 months have you used drugs other than those required for medical reasons? No [No falls in past year] : Patient reported no falls in the past year [0] : 2) Feeling down, depressed, or hopeless: Not at all (0) [HIV Test offered] : HIV Test offered [Hepatitis C test offered] : Hepatitis C test offered [With Family] : lives with family [Employed] : employed [Single] : single [Feels Safe at Home] : Feels safe at home [Smoke Detector] : smoke detector [Seat Belt] :  uses seat belt [Carbon Monoxide Detector] : carbon monoxide detector [Sunscreen] : uses sunscreen [de-identified] : 05/08/2020 [] : No [de-identified] : no [de-identified] : no [Audit-CScore] : 1 [de-identified] : Normal diet  [[id number]] : 0 [Reports changes in hearing] : Reports no changes in hearing [Reports changes in vision] : Reports no changes in vision [Reports changes in dental health] : Reports no changes in dental health

## 2020-05-24 NOTE — HISTORY OF PRESENT ILLNESS
[Post-hospitalization from ___ Hospital] : Post-hospitalization from [unfilled] Hospital [Admitted on: ___] : The patient was admitted on [unfilled] [Patient Contacted By: ____] : and contacted by [unfilled] [Discharged on ___] : discharged on [unfilled] [FreeTextEntry2] : 25 YO MALE discharged from [place] following admission there for management of pneumonia which clinically was c/w Covid based on CT scan however covid PCR was reportedly negative x 3. patient discharged on 5/15/2020. treated with steroids and gradually inproved

## 2020-05-24 NOTE — ASSESSMENT
[FreeTextEntry1] : clinically stable following hospitalization at [facility] following admission for probable Covid related pneumonia\par will try to obtain records from hospital

## 2020-05-24 NOTE — HISTORY OF PRESENT ILLNESS
[Post-hospitalization from ___ Hospital] : Post-hospitalization from [unfilled] Hospital [Admitted on: ___] : The patient was admitted on [unfilled] [Patient Contacted By: ____] : and contacted by [unfilled] [Discharged on ___] : discharged on [unfilled] [FreeTextEntry2] : 25 YO MALE discharged from [facility] following admission there for management of pneumonia which clinically was c/w Covid based on CT scan however covid PCR was reportedly negative x 3. patient discharged on 5/15/2020. treated with steroids and gradually inproved

## 2020-05-24 NOTE — ASSESSMENT
[FreeTextEntry1] : clinically stable following hospitalization at [facility] following admission for probable Covid related pneumonia\par will try to obtain records from hospital

## 2020-05-24 NOTE — HEALTH RISK ASSESSMENT
[Monthly or less (1 pt)] : Monthly or less (1 point) [Never (0 pts)] : Never (0 points) [No] : In the past 12 months have you used drugs other than those required for medical reasons? No [No falls in past year] : Patient reported no falls in the past year [0] : 2) Feeling down, depressed, or hopeless: Not at all (0) [HIV Test offered] : HIV Test offered [Hepatitis C test offered] : Hepatitis C test offered [With Family] : lives with family [Employed] : employed [Single] : single [Feels Safe at Home] : Feels safe at home [Smoke Detector] : smoke detector [Carbon Monoxide Detector] : carbon monoxide detector [Seat Belt] :  uses seat belt [Sunscreen] : uses sunscreen [] : No [de-identified] : 05/08/2020 [de-identified] : no [de-identified] : no [Audit-CScore] : 1 [de-identified] : Normal diet  [[id number]] : 0 [Reports changes in vision] : Reports no changes in vision [Reports changes in dental health] : Reports no changes in dental health [Reports changes in hearing] : Reports no changes in hearing

## 2020-05-27 ENCOUNTER — TRANSCRIPTION ENCOUNTER (OUTPATIENT)
Age: 25
End: 2020-05-27

## 2021-02-09 ENCOUNTER — APPOINTMENT (OUTPATIENT)
Dept: ELECTROPHYSIOLOGY | Facility: CLINIC | Age: 26
End: 2021-02-09
Payer: COMMERCIAL

## 2021-02-09 ENCOUNTER — NON-APPOINTMENT (OUTPATIENT)
Age: 26
End: 2021-02-09

## 2021-02-09 VITALS
DIASTOLIC BLOOD PRESSURE: 79 MMHG | WEIGHT: 145 LBS | OXYGEN SATURATION: 97 % | BODY MASS INDEX: 24.16 KG/M2 | HEIGHT: 65 IN | SYSTOLIC BLOOD PRESSURE: 118 MMHG | RESPIRATION RATE: 14 BRPM | HEART RATE: 89 BPM

## 2021-02-09 DIAGNOSIS — Z87.01 PERSONAL HISTORY OF PNEUMONIA (RECURRENT): ICD-10-CM

## 2021-02-09 DIAGNOSIS — Z86.79 PERSONAL HISTORY OF OTHER DISEASES OF THE CIRCULATORY SYSTEM: ICD-10-CM

## 2021-02-09 DIAGNOSIS — I47.1 SUPRAVENTRICULAR TACHYCARDIA: ICD-10-CM

## 2021-02-09 PROCEDURE — 93000 ELECTROCARDIOGRAM COMPLETE: CPT

## 2021-02-09 PROCEDURE — 99072 ADDL SUPL MATRL&STAF TM PHE: CPT

## 2021-02-09 PROCEDURE — 99203 OFFICE O/P NEW LOW 30 MIN: CPT

## 2021-02-09 RX ORDER — FAMOTIDINE 20 MG/1
20 TABLET, FILM COATED ORAL
Qty: 60 | Refills: 3 | Status: DISCONTINUED | COMMUNITY
Start: 2020-05-06 | End: 2021-02-09

## 2021-02-09 RX ORDER — INHALER, ASSIST DEVICES
SPACER (EA) MISCELLANEOUS
Qty: 1 | Refills: 0 | Status: DISCONTINUED | COMMUNITY
Start: 2020-05-06 | End: 2021-02-09

## 2021-02-09 RX ORDER — ALBUTEROL SULFATE 90 UG/1
108 (90 BASE) INHALANT RESPIRATORY (INHALATION)
Qty: 1 | Refills: 3 | Status: DISCONTINUED | COMMUNITY
Start: 2020-05-06 | End: 2021-02-09

## 2021-02-09 NOTE — PHYSICAL EXAM
[General Appearance - Well Developed] : well developed [Normal Appearance] : normal appearance [Well Groomed] : well groomed [General Appearance - Well Nourished] : well nourished [No Deformities] : no deformities [General Appearance - In No Acute Distress] : no acute distress [Normal Conjunctiva] : the conjunctiva exhibited no abnormalities [Eyelids - No Xanthelasma] : the eyelids demonstrated no xanthelasmas [Normal Oral Mucosa] : normal oral mucosa [No Oral Pallor] : no oral pallor [No Oral Cyanosis] : no oral cyanosis [Normal Jugular Venous A Waves Present] : normal jugular venous A waves present [Normal Jugular Venous V Waves Present] : normal jugular venous V waves present [No Jugular Venous Patel A Waves] : no jugular venous patel A waves [Heart Rate And Rhythm] : heart rate and rhythm were normal [Heart Sounds] : normal S1 and S2 [Murmurs] : no murmurs present [Respiration, Rhythm And Depth] : normal respiratory rhythm and effort [Exaggerated Use Of Accessory Muscles For Inspiration] : no accessory muscle use [Auscultation Breath Sounds / Voice Sounds] : lungs were clear to auscultation bilaterally [Abdomen Soft] : soft [Abdomen Tenderness] : non-tender [Abdomen Mass (___ Cm)] : no abdominal mass palpated [Abnormal Walk] : normal gait [Gait - Sufficient For Exercise Testing] : the gait was sufficient for exercise testing [Nail Clubbing] : no clubbing of the fingernails [Cyanosis, Localized] : no localized cyanosis [Petechial Hemorrhages (___cm)] : no petechial hemorrhages [Skin Color & Pigmentation] : normal skin color and pigmentation [] : no rash [No Venous Stasis] : no venous stasis [Skin Lesions] : no skin lesions [No Skin Ulcers] : no skin ulcer [No Xanthoma] : no  xanthoma was observed [Oriented To Time, Place, And Person] : oriented to person, place, and time [Affect] : the affect was normal [Mood] : the mood was normal [No Anxiety] : not feeling anxious

## 2021-02-11 NOTE — HISTORY OF PRESENT ILLNESS
[FreeTextEntry1] : Vj Bray MD\par \par Brendan Chirinos is a 24y/o man with Hx of recent PNA and kidney infection requiring hospitalizations, both c/b SVT requiring IV medication (likely adenosine) who presents today for initial evaluation. Admits two hospitalizations in 2020 for infections. During both hospitalizations, he went into SVT and was given IVP medication to convert him to NSR. He was hospitalized in [place] at the [facility] in December 2020 with a kidney infection as well as hospitalize in May 2020 at [place] with b/l PNA not deemed COVID. Since that time, he has been doing well. Denies any SVT recurrence. Denies chest pain, palpitations, SOB, syncope or near syncope.\par

## 2021-02-11 NOTE — DISCUSSION/SUMMARY
[FreeTextEntry1] : Brendan Chirinos is a 26y/o man with Hx of recent PNA and kidney infection requiring hospitalizations, both c/b SVT requiring IV medication (likely adenosine) who presents today for initial evaluation. \par \par Impression:\par \par 1. SVT: EKG today NSR. Two episodes of SVT requiring IV medication, suspect adenosine sensitive SVT. Consider options including possible rate control management vs EP study and SVT ablation. Risks, benefits, and alternatives to all discussed. Patient would like to discuss options with relatives first and then get back to us regarding decision. \par \par Sincerely,\par \par Da Singleton MD

## 2021-02-23 ENCOUNTER — RX CHANGE (OUTPATIENT)
Age: 26
End: 2021-02-23

## 2021-02-23 RX ORDER — METOPROLOL TARTRATE 25 MG/1
25 TABLET, FILM COATED ORAL TWICE DAILY
Qty: 60 | Refills: 1 | Status: DISCONTINUED | COMMUNITY
Start: 2021-02-09 | End: 2021-02-23

## 2021-05-06 ENCOUNTER — OUTPATIENT (OUTPATIENT)
Dept: OUTPATIENT SERVICES | Facility: HOSPITAL | Age: 26
LOS: 1 days | End: 2021-05-06

## 2021-05-06 VITALS
RESPIRATION RATE: 16 BRPM | WEIGHT: 143.96 LBS | HEART RATE: 60 BPM | OXYGEN SATURATION: 98 % | SYSTOLIC BLOOD PRESSURE: 120 MMHG | HEIGHT: 65 IN | DIASTOLIC BLOOD PRESSURE: 80 MMHG | TEMPERATURE: 98 F

## 2021-05-06 DIAGNOSIS — Z98.890 OTHER SPECIFIED POSTPROCEDURAL STATES: Chronic | ICD-10-CM

## 2021-05-06 DIAGNOSIS — I47.1 SUPRAVENTRICULAR TACHYCARDIA: ICD-10-CM

## 2021-05-06 LAB
ALBUMIN SERPL ELPH-MCNC: 4.5 G/DL — SIGNIFICANT CHANGE UP (ref 3.3–5)
ALP SERPL-CCNC: 45 U/L — SIGNIFICANT CHANGE UP (ref 40–120)
ALT FLD-CCNC: 12 U/L — SIGNIFICANT CHANGE UP (ref 4–41)
ANION GAP SERPL CALC-SCNC: 11 MMOL/L — SIGNIFICANT CHANGE UP (ref 7–14)
AST SERPL-CCNC: 15 U/L — SIGNIFICANT CHANGE UP (ref 4–40)
BILIRUB SERPL-MCNC: 2.4 MG/DL — HIGH (ref 0.2–1.2)
BLD GP AB SCN SERPL QL: NEGATIVE — SIGNIFICANT CHANGE UP
BUN SERPL-MCNC: 15 MG/DL — SIGNIFICANT CHANGE UP (ref 7–23)
CALCIUM SERPL-MCNC: 9.6 MG/DL — SIGNIFICANT CHANGE UP (ref 8.4–10.5)
CHLORIDE SERPL-SCNC: 105 MMOL/L — SIGNIFICANT CHANGE UP (ref 98–107)
CO2 SERPL-SCNC: 24 MMOL/L — SIGNIFICANT CHANGE UP (ref 22–31)
CREAT SERPL-MCNC: 0.9 MG/DL — SIGNIFICANT CHANGE UP (ref 0.5–1.3)
GLUCOSE SERPL-MCNC: 82 MG/DL — SIGNIFICANT CHANGE UP (ref 70–99)
HCT VFR BLD CALC: 42.7 % — SIGNIFICANT CHANGE UP (ref 39–50)
HGB BLD-MCNC: 14.7 G/DL — SIGNIFICANT CHANGE UP (ref 13–17)
MCHC RBC-ENTMCNC: 28.4 PG — SIGNIFICANT CHANGE UP (ref 27–34)
MCHC RBC-ENTMCNC: 34.4 GM/DL — SIGNIFICANT CHANGE UP (ref 32–36)
MCV RBC AUTO: 82.6 FL — SIGNIFICANT CHANGE UP (ref 80–100)
NRBC # BLD: 0 /100 WBCS — SIGNIFICANT CHANGE UP
NRBC # FLD: 0 K/UL — SIGNIFICANT CHANGE UP
PLATELET # BLD AUTO: 283 K/UL — SIGNIFICANT CHANGE UP (ref 150–400)
POTASSIUM SERPL-MCNC: 4.1 MMOL/L — SIGNIFICANT CHANGE UP (ref 3.5–5.3)
POTASSIUM SERPL-SCNC: 4.1 MMOL/L — SIGNIFICANT CHANGE UP (ref 3.5–5.3)
PROT SERPL-MCNC: 7.4 G/DL — SIGNIFICANT CHANGE UP (ref 6–8.3)
RBC # BLD: 5.17 M/UL — SIGNIFICANT CHANGE UP (ref 4.2–5.8)
RBC # FLD: 13.1 % — SIGNIFICANT CHANGE UP (ref 10.3–14.5)
RH IG SCN BLD-IMP: NEGATIVE — SIGNIFICANT CHANGE UP
SODIUM SERPL-SCNC: 140 MMOL/L — SIGNIFICANT CHANGE UP (ref 135–145)
WBC # BLD: 5.93 K/UL — SIGNIFICANT CHANGE UP (ref 3.8–10.5)
WBC # FLD AUTO: 5.93 K/UL — SIGNIFICANT CHANGE UP (ref 3.8–10.5)

## 2021-05-06 NOTE — H&P PST ADULT - ATTENDING COMMENTS
24 y/o male presents to PST preop for SVT ablation. pt reports he went into SVT's while hospitalized in May 2020 for bilateral PNA (negative for COVID 19) and December 2020 while hospitalized for a kidney infection. he states he was given IV medications to bring him back to normal sinus rhythm.

## 2021-05-06 NOTE — H&P PST ADULT - HISTORY OF PRESENT ILLNESS
24 y/o male presents to PST preop for SVT ablation. pt reports he went into SVT's while was hospitalized in May 2020 for bilateral PNA (negative for COVID 19) and December 2020 while hospitalized for a kidney infection. he states he was given IV medications to bring him back to normal sinus rhythm.  26 y/o male presents to PST preop for SVT ablation. pt reports he went into SVT's while hospitalized in May 2020 for bilateral PNA (negative for COVID 19) and December 2020 while hospitalized for a kidney infection. he states he was given IV medications to bring him back to normal sinus rhythm.

## 2021-05-06 NOTE — H&P PST ADULT - CARDIOVASCULAR SYMPTOMS
occasional palpitations and SOB with running. pt says he can still complete activity despite symptoms/palpitations/dyspnea on exertion

## 2021-05-06 NOTE — H&P PST ADULT - PATIENT ON (OXYGEN DELIVERY METHOD)
Visit Information Date & Time Provider Department Dept. Phone Encounter #  
 5/30/2017 10:45 AM Ivan Lam MD [address] [place] [address] [phone] [id number] Follow-up Instructions Return in about 3 months (around 8/30/2017) for HLD follow up. Upcoming Health Maintenance Date Due DTaP/Tdap/Td series (1 - Tdap) 10/22/1969 MEDICARE YEARLY EXAM 10/22/2013 INFLUENZA AGE 9 TO ADULT 8/1/2017 GLAUCOMA SCREENING Q2Y 1/11/2019 Pneumococcal 65+ High/Highest Risk (2 of 2 - PPSV23) 4/22/2019 COLONOSCOPY 3/8/2022 Allergies as of 5/30/2017  Review Complete On: 5/30/2017 By: Ivan Lam MD  
 No Known Allergies Current Immunizations  Reviewed on 5/30/2017 Name Date Influenza High Dose Vaccine PF 10/30/2015, 10/23/2014 Influenza Vaccine 11/17/2016 Pneumococcal Vaccine (Unspecified Type) 4/22/2014 Zoster Vaccine, Live 10/11/2011 Reviewed by Che Soto LPN on 5/50/7124 at 61:75 AM  
You Were Diagnosed With   
  
 Codes Comments Encounter for immunization    -  Primary ICD-10-CM: M86 ICD-9-CM: V03.89 Vitals BP Pulse Temp Resp Height(growth percentile) Weight(growth percentile) 110/64 (BP 1 Location: Left arm, BP Patient Position: Sitting) (!) 52 98.1 °F (36.7 °C) (Oral) 18 5' 7\" (1.702 m) 174 lb 8 oz (79.2 kg) SpO2 BMI Smoking Status 98% 27.33 kg/m2 Never Smoker Vitals History BMI and BSA Data Body Mass Index Body Surface Area  
 27.33 kg/m 2 1.93 m 2 Preferred Pharmacy Pharmacy Name Phone [phone] [phone] Your Updated Medication List  
  
   
This list is accurate as of: 5/30/17 11:13 AM.  Always use your most recent med list. amLODIPine 5 mg tablet Commonly known as:  Darin Anne Take 1 Tab by mouth daily. Indications: hypertension * ASPIRIN PO Take 81 mg by mouth daily. * aspirin delayed-release 81 mg tablet Take  by mouth daily. diph,Pertuss(AC),Tet Vac-PF 2.5-8-5 Lf-mcg suspension Commonly known as:  BOOSTRIX  
0.5 mL by IntraMUSCular route once for 1 dose. Please fax vaccination documentation to [phone] Attn: Dr. Pranav Scott  
  
 oxybutynin chloride XL 15 mg CR tablet Commonly known as:  DITROPAN XL  
  
 ZyrTEC 10 mg Cap Generic drug:  Cetirizine Take  by mouth. * Notice: This list has 2 medication(s) that are the same as other medications prescribed for you. Read the directions carefully, and ask your doctor or other care provider to review them with you. Prescriptions Printed Refills diph,Pertuss,AC,,Tet Vac-PF (BOOSTRIX) 2.5-8-5 Lf-mcg suspension 0 Si.5 mL by IntraMUSCular route once for 1 dose. Please fax vaccination documentation to [phone] Attn: Dr. Pranav Scott Class: Print Route: IntraMUSCular Follow-up Instructions Return in about 3 months (around 2017) for HLD follow up. Patient Instructions Travel Clinics: 
 
[address] 
[employer] [address] [place], [address] Phone [phone] Fax [phone] Hours 9am  6pm  
9am  1pm Saturday [employer] [address] [place] [id number] 
[phone] DASH Diet: Care Instructions Your Care Instructions The DASH diet is an eating plan that can help lower your blood pressure. DASH stands for Dietary Approaches to Stop Hypertension. Hypertension is high blood pressure. The DASH diet focuses on eating foods that are high in calcium, potassium, and magnesium. These nutrients can lower blood pressure. The foods that are highest in these nutrients are fruits, vegetables, low-fat dairy products, nuts, seeds, and legumes.  But taking calcium, potassium, and magnesium supplements instead of eating foods that are high in those nutrients does not have the same effect. The DASH diet also includes whole grains, fish, and poultry. The DASH diet is one of several lifestyle changes your doctor may recommend to lower your high blood pressure. Your doctor may also want you to decrease the amount of sodium in your diet. Lowering sodium while following the DASH diet can lower blood pressure even further than just the DASH diet alone. Follow-up care is a key part of your treatment and safety. Be sure to make and go to all appointments, and call your doctor if you are having problems. It's also a good idea to know your test results and keep a list of the medicines you take. How can you care for yourself at home? Following the DASH diet · Eat 4 to 5 servings of fruit each day. A serving is 1 medium-sized piece of fruit, ½ cup chopped or canned fruit, 1/4 cup dried fruit, or 4 ounces (½ cup) of fruit juice. Choose fruit more often than fruit juice. · Eat 4 to 5 servings of vegetables each day. A serving is 1 cup of lettuce or raw leafy vegetables, ½ cup of chopped or cooked vegetables, or 4 ounces (½ cup) of vegetable juice. Choose vegetables more often than vegetable juice. · Get 2 to 3 servings of low-fat and fat-free dairy each day. A serving is 8 ounces of milk, 1 cup of yogurt, or 1 ½ ounces of cheese. · Eat 6 to 8 servings of grains each day. A serving is 1 slice of bread, 1 ounce of dry cereal, or ½ cup of cooked rice, pasta, or cooked cereal. Try to choose whole-grain products as much as possible. · Limit lean meat, poultry, and fish to 2 servings each day. A serving is 3 ounces, about the size of a deck of cards. · Eat 4 to 5 servings of nuts, seeds, and legumes (cooked dried beans, lentils, and split peas) each week. A serving is 1/3 cup of nuts, 2 tablespoons of seeds, or ½ cup of cooked beans or peas. · Limit fats and oils to 2 to 3 servings each day. A serving is 1 teaspoon of vegetable oil or 2 tablespoons of salad dressing. · Limit sweets and added sugars to 5 servings or less a week. A serving is 1 tablespoon jelly or jam, ½ cup sorbet, or 1 cup of lemonade. · Eat less than 2,300 milligrams (mg) of sodium a day. If you limit your sodium to 1,500 mg a day, you can lower your blood pressure even more. Tips for success · Start small. Do not try to make dramatic changes to your diet all at once. You might feel that you are missing out on your favorite foods and then be more likely to not follow the plan. Make small changes, and stick with them. Once those changes become habit, add a few more changes. · Try some of the following: ¨ Make it a goal to eat a fruit or vegetable at every meal and at snacks. This will make it easy to get the recommended amount of fruits and vegetables each day. ¨ Try yogurt topped with fruit and nuts for a snack or healthy dessert. ¨ Add lettuce, tomato, cucumber, and onion to sandwiches. ¨ Combine a ready-made pizza crust with low-fat mozzarella cheese and lots of vegetable toppings. Try using tomatoes, squash, spinach, broccoli, carrots, cauliflower, and onions. ¨ Have a variety of cut-up vegetables with a low-fat dip as an appetizer instead of chips and dip. ¨ Sprinkle sunflower seeds or chopped almonds over salads. Or try adding chopped walnuts or almonds to cooked vegetables. ¨ Try some vegetarian meals using beans and peas. Add garbanzo or kidney beans to salads. Make burritos and tacos with mashed lincoln beans or black beans. Where can you learn more? Go to http://kam-marianna.info/. Enter D002 in the search box to learn more about \"DASH Diet: Care Instructions. \" Current as of: March 23, 2016 Content Version: 11.2 © 5866-8536 [employer]. Care instructions adapted under license by [employer] (which disclaims liability or warranty for this information).  If you have questions about a medical condition or this instruction, always ask your healthcare professional. [place] [address] any warranty or liability for your use of this information. Introducing [address]! Chas Pritchett introduces [facility] patient portal. Now you can access parts of your medical record, email your doctor's office, and request medication refills online. 1. In your internet browser, go to [email or web address] 2. Click on the First Time User? Click Here link in the Sign In box. You will see the New Member Sign Up page. 3. Enter your [facility] Access Code exactly as it appears below. You will not need to use this code after youve completed the sign-up process. If you do not sign up before the expiration date, you must request a new code. · [facility] Access Code: [email or web address] Expires: 8/28/2017 10:56 AM 
 
4. Enter the last four digits of your Social Security Number (xxxx) and Date of Birth (mm/dd/yyyy) as indicated and click Submit. You will be taken to the next sign-up page. 5. Create a [facility] ID. This will be your [facility] login ID and cannot be changed, so think of one that is secure and easy to remember. 6. Create a [facility] password. You can change your password at any time. 7. Enter your Password Reset Question and Answer. This can be used at a later time if you forget your password. 8. Enter your e-mail address. You will receive e-mail notification when new information is available in [address] 9. Click Sign Up. You can now view and download portions of your medical record. 10. Click the Download Summary menu link to download a portable copy of your medical information. If you have questions, please visit the Frequently Asked Questions section of the [facility] website. Remember, [facility] is NOT to be used for urgent needs. For medical emergencies, dial 911. Now available from your iPhone and Android! Please provide this summary of care documentation to your next provider. Your primary care clinician is listed as Burak Reeder. If you have any questions after today's visit, please call [phone]. room air

## 2021-05-06 NOTE — H&P PST ADULT - NSICDXPROBLEM_GEN_ALL_CORE_FT
PROBLEM DIAGNOSES  Problem: SVT (supraventricular tachycardia)  Assessment and Plan: preop for SVT ablation on 5/13/21  pt instructed to follow instructions on EP worksheet provided by Dr. Singleton's office   chlorhexidine wash provided  pt is scheduled for COVID testing preop         PROBLEM DIAGNOSES  Problem: SVT (supraventricular tachycardia)  Assessment and Plan: preop for SVT ablation on 5/13/21  pt instructed to follow instructions on EP worksheet provided by Dr. Singleton's office   chlorhexidine wash provided, pt verbalized understanding   pt informed COVID testing must be done 72 hours preop  ECHO and Stress test reports requested from Cardiologist Dr. Walker

## 2021-05-06 NOTE — H&P PST ADULT - RESPIRATORY RATE (BREATHS/MIN)
COPD/PN Week 2 Survey      Responses   [facility] [place], operated by [facility] patient discharged from?  [place]   Does the patient have one of the following disease processes/diagnoses(primary or secondary)?  COPD/Pneumonia   Was the primary reason for admission:  Pneumonia   Week 2 attempt successful?  Yes   Call start time  1556   Call end time  1600   Discharge diagnosis  aspiration pna, tube feedings started   Person spoke with today (if not patient) and relationship  Marce   Meds reviewed with patient/caregiver?  Yes   Is the patient taking all medications as directed (includes completed medication regime)?  Yes   Medication comments  Taking more antibiotic   Has the patient kept scheduled appointments due by today?  Yes   What is the Home health agency?   [facility] providing [facility] feed[facility]   Has home health visited the patient within 72 hours of discharge?  Yes   Pulse Ox monitoring  Intermittent   Pulse Ox device source  Patient   O2 Sat comments  O2 2.5 liters, sat is 99%   What is the patient's perception of their health status since discharge?  Returned to baseline/stable   Are the patient's immunizations up to date?   Yes   Week 2 call completed?  Yes   Wrap up additional comments  Got rocephin injection and started on oral antibiotic per PCP on 11/20. Afebrile for past 24 hours.  HH making visits.            Laly Henning RN   16

## 2021-05-13 ENCOUNTER — OUTPATIENT (OUTPATIENT)
Dept: OUTPATIENT SERVICES | Facility: HOSPITAL | Age: 26
LOS: 1 days | Discharge: HOME | End: 2021-05-13
Payer: COMMERCIAL

## 2021-05-13 DIAGNOSIS — I47.1 SUPRAVENTRICULAR TACHYCARDIA: ICD-10-CM

## 2021-05-13 DIAGNOSIS — Z98.890 OTHER SPECIFIED POSTPROCEDURAL STATES: Chronic | ICD-10-CM

## 2021-05-13 PROCEDURE — 93010 ELECTROCARDIOGRAM REPORT: CPT | Mod: 76

## 2021-05-13 PROCEDURE — 93613 INTRACARDIAC EPHYS 3D MAPG: CPT

## 2021-05-13 PROCEDURE — 93623 PRGRMD STIMJ&PACG IV RX NFS: CPT | Mod: 26

## 2021-05-13 PROCEDURE — 93662 INTRACARDIAC ECG (ICE): CPT | Mod: 26

## 2021-05-13 PROCEDURE — 93653 COMPRE EP EVAL TX SVT: CPT

## 2021-05-13 RX ORDER — SODIUM CHLORIDE 9 MG/ML
3 INJECTION INTRAMUSCULAR; INTRAVENOUS; SUBCUTANEOUS EVERY 8 HOURS
Refills: 0 | Status: DISCONTINUED | OUTPATIENT
Start: 2021-05-13 | End: 2021-05-27

## 2021-05-13 NOTE — CHART NOTE - NSCHARTNOTEFT_GEN_A_CORE
Type of Procedure: Electrophysiology Testing and Ablation of AVRT  Licensed independent practitioner: Da Singleton MD  Assistant: none  Description of procedure: The patient was brought to the EP lab in the post absorptive state and was sedated by an anesthesiologist.  Under sterile conditions, femoral venous access was obtained and catheters were advanced to the CS, His and RVA. AVRT was induced with catheter manipulation and CS activation showed eccentric activation with the earliest retrograde atrial activation occurring at the lateral LA.   Heparin 10,000 units was administered followed by a 5000 unit bolus. Using Carto, a FAM of the right atrium, SVC, IVC and coronary sinus was completed.  A  His cloud was created.  The left free wall accessory pathway was targeted for ablation via transseptal access.  An endocardial shell of the left atrium was created using ICE guidance and the pulmonary veins, left atrial appendage and esophagus were tagged.  Transeptal access was achieved using ICE guidance and an 8.5 Vizigo Sheath and C1 [place] needle. The mean left atrial pressure was 8-10 mm Hg. The earliest retrograde atrial activation was targeted for ablation. Several applications of RF energy delivered to the earliest retrograde LA activation terminated SVT and post ablation VA conduction was absent without catecholamines. Post ablation AVRT was no longer inducible despite atrial and ventricular extrastimuli, burst atrial and ventricular pacing with and without dobutamine.  The catheters and sheaths were removed and hemostasis achieved using vascular closure device. The patient was transferred to the recovery room in stable condition. A representative from [employer] was present to help operate a sophisticated mapping system.   Findings of procedure: left free wall accessory pathway and AVRT  Estimated blood loss: < 10 cc  Specimen removed: none  Preoperative Dx: AVRT  Postoperative Dx: AVRT; s/p ablation no inducible SVT  Complications: none  Anesthesia type: local with sedation  No heparin for 24 hours and then reassess.    Da Singleton MD

## 2021-05-13 NOTE — CHART NOTE - NSCHARTNOTEFT_GEN_A_CORE
Patient is s/p SVT ablation on 5/13/21 with Dr. Singleton.  Teaching provided to patient regarding right groin site care.  Right groin without hematoma, ecchymosis, drainage, pain, or bleeding.      - may shower after 24 hrs, otherwise keep groin incision sites dry and clean.    - Avoid activities such as jogging/excessive stair climbing/weight lifting for the next 7 days    - Take Acetaminophen (Tylenol) 500mg, one to two tablets every 8 hours as needed for pain relief.    - Pt was instructed to call [phone] if the following occurs:      - fever with temperature > 101      - swelling or bleeding at the groin incision site   - Outpatient F/U is scheduled with Dr. Singleton on 6/15/21 @ 9:30 am    All questions answered to patient's satisfaction.  Follow up letter and instructions left in the care of the patient.

## 2021-05-13 NOTE — CHART NOTE - NSCHARTNOTEFT_GEN_A_CORE
In brief this is a 24 y/o M with no significant PMH, recently found to have SVT while admitted in December 2020 in [place] for Kidney infection and PNA presented to [facility] today for SVT ablation. Patient stated that he was in the hospital in [place] and then while admitted developed SVT for which he was given IV medication. Patient stated that in the hospital he "had severe symptoms" and then 2 months ago while he was golfing he developed palpitations(felt like his heart is racing) and this lasted for 30 seconds and self resolved. As per the patient he did not have any lightheadedness, near syncope, chest pain, SOB or dizziness with this last episode. Patient stated that he is currently not on any medications. Reviewed pre-surgical testing H&P and also [employer] note by Dr. Singleton which was dated from 02/09/21. Explained in detail the procedure to the patient and explained all risks and benefits. Patient understood and did not have any questions. Patient signed the consents.     EKG: Sinus bradycardia at a rate of 59 with QTc of 421    COVID PCR not detected on In brief this is a 24 y/o M with no significant PMH, recently found to have SVT while admitted in December 2020 in [place] for Kidney infection and PNA presented to [facility] today for SVT ablation. Patient stated that he was in the hospital in [place] and then while admitted developed SVT for which he was given IV medication. Patient stated that in the hospital he "had severe symptoms" and then 2 months ago while he was golfing he developed palpitations(felt like his heart is racing) and this lasted for 30 seconds and self resolved. As per the patient he did not have any lightheadedness, near syncope, chest pain, SOB or dizziness with this last episode. Patient stated that he is currently not on any medications. Reviewed pre-surgical testing H&P and also [employer] note by Dr. Singleton which was dated from 02/09/21. Explained in detail the procedure to the patient and explained all risks and benefits. Patient understood and did not have any questions. Patient signed the consents.     EKG: Sinus bradycardia at a rate of 59 with QTc of 421    COVID PCR not detected on 05/10/21

## 2021-05-29 DIAGNOSIS — T81.49XA INFECTION FOLLOWING A PROCEDURE, OTHER SURGICAL SITE, INITIAL ENCOUNTER: ICD-10-CM

## 2021-05-29 RX ORDER — METOPROLOL TARTRATE 25 MG/1
25 TABLET, FILM COATED ORAL
Qty: 180 | Refills: 1 | Status: DISCONTINUED | COMMUNITY
Start: 2021-02-23 | End: 2021-05-29

## 2021-05-29 RX ORDER — CEPHALEXIN 500 MG/1
500 CAPSULE ORAL
Qty: 7 | Refills: 21 | Status: ACTIVE | COMMUNITY
Start: 2021-05-29 | End: 1900-01-01

## 2021-06-15 ENCOUNTER — APPOINTMENT (OUTPATIENT)
Dept: ELECTROPHYSIOLOGY | Facility: CLINIC | Age: 26
End: 2021-06-15

## 2021-07-14 ENCOUNTER — NON-APPOINTMENT (OUTPATIENT)
Age: 26
End: 2021-07-14

## 2021-07-16 ENCOUNTER — EMERGENCY (EMERGENCY)
Facility: HOSPITAL | Age: 26
LOS: 1 days | Discharge: HOME | End: 2021-07-16
Attending: STUDENT IN AN ORGANIZED HEALTH CARE EDUCATION/TRAINING PROGRAM | Admitting: STUDENT IN AN ORGANIZED HEALTH CARE EDUCATION/TRAINING PROGRAM
Payer: COMMERCIAL

## 2021-07-16 VITALS
HEART RATE: 61 BPM | OXYGEN SATURATION: 99 % | TEMPERATURE: 98 F | DIASTOLIC BLOOD PRESSURE: 78 MMHG | SYSTOLIC BLOOD PRESSURE: 113 MMHG | HEIGHT: 65 IN | RESPIRATION RATE: 17 BRPM

## 2021-07-16 DIAGNOSIS — Z98.890 OTHER SPECIFIED POSTPROCEDURAL STATES: Chronic | ICD-10-CM

## 2021-07-16 LAB
ANION GAP SERPL CALC-SCNC: 15 MMOL/L — HIGH (ref 7–14)
BASOPHILS # BLD AUTO: 0.06 K/UL — SIGNIFICANT CHANGE UP (ref 0–0.2)
BASOPHILS NFR BLD AUTO: 0.6 % — SIGNIFICANT CHANGE UP (ref 0–2)
BUN SERPL-MCNC: 17 MG/DL — SIGNIFICANT CHANGE UP (ref 7–23)
CALCIUM SERPL-MCNC: 9.4 MG/DL — SIGNIFICANT CHANGE UP (ref 8.4–10.5)
CHLORIDE SERPL-SCNC: 101 MMOL/L — SIGNIFICANT CHANGE UP (ref 98–107)
CO2 SERPL-SCNC: 24 MMOL/L — SIGNIFICANT CHANGE UP (ref 22–31)
CREAT SERPL-MCNC: 0.94 MG/DL — SIGNIFICANT CHANGE UP (ref 0.5–1.3)
EOSINOPHIL # BLD AUTO: 0.02 K/UL — SIGNIFICANT CHANGE UP (ref 0–0.5)
EOSINOPHIL NFR BLD AUTO: 0.2 % — SIGNIFICANT CHANGE UP (ref 0–6)
GLUCOSE SERPL-MCNC: 93 MG/DL — SIGNIFICANT CHANGE UP (ref 70–99)
HCT VFR BLD CALC: 41.7 % — SIGNIFICANT CHANGE UP (ref 39–50)
HGB BLD-MCNC: 14.2 G/DL — SIGNIFICANT CHANGE UP (ref 13–17)
IANC: 7.55 K/UL — SIGNIFICANT CHANGE UP (ref 1.5–8.5)
IMM GRANULOCYTES NFR BLD AUTO: 0.3 % — SIGNIFICANT CHANGE UP (ref 0–1.5)
LYMPHOCYTES # BLD AUTO: 1.95 K/UL — SIGNIFICANT CHANGE UP (ref 1–3.3)
LYMPHOCYTES # BLD AUTO: 19.3 % — SIGNIFICANT CHANGE UP (ref 13–44)
MCHC RBC-ENTMCNC: 29.5 PG — SIGNIFICANT CHANGE UP (ref 27–34)
MCHC RBC-ENTMCNC: 34.1 GM/DL — SIGNIFICANT CHANGE UP (ref 32–36)
MCV RBC AUTO: 86.7 FL — SIGNIFICANT CHANGE UP (ref 80–100)
MONOCYTES # BLD AUTO: 0.51 K/UL — SIGNIFICANT CHANGE UP (ref 0–0.9)
MONOCYTES NFR BLD AUTO: 5 % — SIGNIFICANT CHANGE UP (ref 2–14)
NEUTROPHILS # BLD AUTO: 7.55 K/UL — HIGH (ref 1.8–7.4)
NEUTROPHILS NFR BLD AUTO: 74.6 % — SIGNIFICANT CHANGE UP (ref 43–77)
NRBC # BLD: 0 /100 WBCS — SIGNIFICANT CHANGE UP
NRBC # FLD: 0 K/UL — SIGNIFICANT CHANGE UP
PLATELET # BLD AUTO: 251 K/UL — SIGNIFICANT CHANGE UP (ref 150–400)
POTASSIUM SERPL-MCNC: 4.1 MMOL/L — SIGNIFICANT CHANGE UP (ref 3.5–5.3)
POTASSIUM SERPL-SCNC: 4.1 MMOL/L — SIGNIFICANT CHANGE UP (ref 3.5–5.3)
RBC # BLD: 4.81 M/UL — SIGNIFICANT CHANGE UP (ref 4.2–5.8)
RBC # FLD: 12.3 % — SIGNIFICANT CHANGE UP (ref 10.3–14.5)
SODIUM SERPL-SCNC: 140 MMOL/L — SIGNIFICANT CHANGE UP (ref 135–145)
TROPONIN T, HIGH SENSITIVITY RESULT: <6 NG/L — SIGNIFICANT CHANGE UP
WBC # BLD: 10.12 K/UL — SIGNIFICANT CHANGE UP (ref 3.8–10.5)
WBC # FLD AUTO: 10.12 K/UL — SIGNIFICANT CHANGE UP (ref 3.8–10.5)

## 2021-07-16 PROCEDURE — 99285 EMERGENCY DEPT VISIT HI MDM: CPT | Mod: 25

## 2021-07-16 PROCEDURE — 93010 ELECTROCARDIOGRAM REPORT: CPT

## 2021-07-16 NOTE — ED PROVIDER NOTE - PHYSICAL EXAMINATION
Gen: NAD, AOx3  Head: NCAT  HEENT: PERRL, oral mucosa moist, normal conjunctiva  Lung: CTAB, no respiratory distress  CV: rrr, no murmurs, Normal perfusion  Abd: soft, NTND  MSK: No edema, no visible deformities, no calf swelling or tenderness  Neuro: No focal neurologic deficits  Skin: No rash   Psych: normal affect

## 2021-07-16 NOTE — ED PROVIDER NOTE - OBJECTIVE STATEMENT
25M pmh SVT s/p ablation in May p/w 3-4 days of chest pain.  Worse with movements and inspiration, worse laying down.  No associated shortness of breath, nausea/vomiting/diarrhea, calf pain or swelling.  Pt was seen by his cardiologist (Dr. Luis Bray) yesterday, had labs drawn and was given steroids for possible pericarditis.  Today patient received a call from his doctor that his d-dimer was elevated and he needed to go to the ER for a CT scan.  no f/c, hemoptysis.  - Ida Osorio DO

## 2021-07-16 NOTE — ED ADULT TRIAGE NOTE - CHIEF COMPLAINT QUOTE
Pt c/o non-radiating chest pain worse with exertion since Sunday. Pt denies SOB, palpitations, fever, chills, nausea, vomiting, abdominal pain. Pt had ablation for SVT in May.

## 2021-07-16 NOTE — ED PROVIDER NOTE - PROGRESS NOTE DETAILS
MARIUM Dominguez: pt feels better ambulating without difficulty.  CTA negative no PE.  Results reviewed with patient.  Discharge reviewed and discussed with patient.

## 2021-07-16 NOTE — ED PROVIDER NOTE - PATIENT PORTAL LINK FT
You can access the FollowMyHealth Patient Portal offered by [facility] by registering at the following website: [email or web address]. By joining [employer]’s FollowMyHealth portal, you will also be able to view your health information using other applications (apps) compatible with our system.

## 2021-07-16 NOTE — ED ADULT NURSE NOTE - OBJECTIVE STATEMENT
Received pt in intake 12, ambulatory, pt A&Ox4, respirations even and unlabored b/l. Pt c/o chest pain since Sunday. Denies dizziness/lightheadedness, denies SOB. Abdomen soft, nondistended, nontender. Reports vapes and smokes marijuana last used 1-2 weeks ago. PMHx ablation for SVT. Appears in no apparent distress at this time. Awaiting CT. IVL 20g Angiocath placed on right AC. Labs sent. Will continue to monitor.

## 2021-07-16 NOTE — ED PROVIDER NOTE - NSFOLLOWUPINSTRUCTIONS_ED_ALL_ED_FT
Follow up with your Doctor in 1-2 days.    Follow up with your cardiologist in 1-2 days.    Return to the ER for any persistent/worsening or new symptoms, chest pain, shortness of breath, palpitations, dizziness or any concerning symptoms.

## 2021-07-16 NOTE — ED PROVIDER NOTE - CLINICAL SUMMARY MEDICAL DECISION MAKING FREE TEXT BOX
25M pmh SVT s/p ablation in May p/w 3-4 days of chest pain.  Worse with movements and inspiration, worse laying down.  No associated shortness of breath, nausea/vomiting/diarrhea, calf pain or swelling.  Pt was seen by his cardiologist (Dr. Luis Bray) yesterday, had labs drawn and was given steroids for possible pericarditis.  Today patient received a call from his doctor that his d-dimer was elevated and he needed to go to the ER for a CT scan.  no f/c, hemoptysis.  Concern fir possible PE given symptoms and elevated dimer, however pericarditis would also elevate dimer.  EKG has no signs of ischemia and is not consistent with pericarditis.  Plan for labs, CTA chest and reassess.  - Ida Osorio, DO

## 2021-07-17 VITALS
SYSTOLIC BLOOD PRESSURE: 121 MMHG | RESPIRATION RATE: 16 BRPM | HEART RATE: 61 BPM | TEMPERATURE: 98 F | OXYGEN SATURATION: 100 % | DIASTOLIC BLOOD PRESSURE: 80 MMHG

## 2021-07-17 PROBLEM — I47.1 SUPRAVENTRICULAR TACHYCARDIA: Chronic | Status: ACTIVE | Noted: 2021-05-06

## 2021-07-17 PROBLEM — J18.9 PNEUMONIA, UNSPECIFIED ORGANISM: Chronic | Status: ACTIVE | Noted: 2021-05-06

## 2021-07-17 PROCEDURE — 71275 CT ANGIOGRAPHY CHEST: CPT | Mod: 26

## 2022-02-25 NOTE — ED PROVIDER NOTE - CHIEF COMPLAINT
The patient is a 24y Male complaining of abdominal pain.
Spine appears normal, movement of extremities grossly intact.

## 2023-04-01 NOTE — ED ADULT TRIAGE NOTE - DOMESTIC TRAVEL HIGH RISK QUESTION
Behavioral Health IP Nursing Progress Note    Suicidal Ideation: Patient denies     Current C-SSRS score: Negative Screen - White (04/01/23 0900)      Protective Factors / Reason for Living: Future orientation, Responsibility to children, Responsibility to pets, Social supports, Positive therapeutic relationships, Restricted access to highly lethal methods of suicide    Interventions:   · 15 minute safety checks     Other Interventions Implemented:  · Visual inspection of patient's environment completed. Items removed: none needing removal     Subjective: Patient denies current suicidal thoughts, ideations, and plan. Patient denies current homicidal thoughts, ideations, and plan. Patient denies both current auditory and visual hallucinations. Patient agrees to notify staff of any safety concerns during the shift. Will continue to monitor.     Objective:   Mental Health: Patient behavior observed to be cooperative.     Medical:   • CIWA Protocol: Progressing  • Pain     Assessment / Actions:   PRN Medications given?   Yes. Patient Response: Patient was given atarax 50 mg (prn) at 0730 for anxiety; medication effective. Patient was given atarax 50 mg (prn) at 1149 for anxiety; medication effective.     Plan:   Treatment Plan reviewed.      At approximately 1205 patient verbalized to RN: \"I feel like I may have a seizure - I am light-headed and see black spots at times\". Vitals and CIWA obtained at 1210: Temp: 97.9, Pulse: 90, BP: 124/83, Respirations: 16, and SPO2: 97% with a CIWA score of 1 (Anxiety). Dr. Aviles (psychiatrist) was paged and notified at 1215. In addition, Dr. Aviles notified of atarax 50 mg (prn) medication being administered to patient at 0730 and 1149 for anxiety. RN received a call back from Dr. Aviles at 1220 and notified to continue to monitor (no additional orders obtained at this time). Will continue to monitor.    Yes

## 2023-05-23 NOTE — H&P PST ADULT - HEIGHT IN FEET
How Severe Is Your Skin Lesion?: moderate
Have Your Skin Lesions Been Treated?: not been treated
Is This A New Presentation, Or A Follow-Up?: Skin Lesions
5

## 2025-01-24 NOTE — ED ADULT TRIAGE NOTE - NS ED TRIAGE AVPU SCALE
Alert-The patient is alert, awake and responds to voice. The patient is oriented to time, place, and person. The triage nurse is able to obtain subjective information.
Patient requests all Lab, Cardiology, and Radiology Results on their Discharge Instructions

## 2025-05-23 ENCOUNTER — EMERGENCY (EMERGENCY)
Facility: HOSPITAL | Age: 30
LOS: 1 days | End: 2025-05-23
Admitting: EMERGENCY MEDICINE
Payer: COMMERCIAL

## 2025-05-23 VITALS
DIASTOLIC BLOOD PRESSURE: 80 MMHG | TEMPERATURE: 98 F | RESPIRATION RATE: 16 BRPM | WEIGHT: 145.06 LBS | HEART RATE: 83 BPM | OXYGEN SATURATION: 95 % | HEIGHT: 65 IN | SYSTOLIC BLOOD PRESSURE: 120 MMHG

## 2025-05-23 DIAGNOSIS — Z98.890 OTHER SPECIFIED POSTPROCEDURAL STATES: Chronic | ICD-10-CM

## 2025-05-23 PROCEDURE — 99284 EMERGENCY DEPT VISIT MOD MDM: CPT

## 2025-05-23 PROCEDURE — 73130 X-RAY EXAM OF HAND: CPT | Mod: 26,RT

## 2025-05-23 RX ORDER — ACETAMINOPHEN 500 MG/5ML
650 LIQUID (ML) ORAL ONCE
Refills: 0 | Status: COMPLETED | OUTPATIENT
Start: 2025-05-23 | End: 2025-05-23

## 2025-05-23 RX ORDER — KETOROLAC TROMETHAMINE 30 MG/ML
30 INJECTION, SOLUTION INTRAMUSCULAR; INTRAVENOUS ONCE
Refills: 0 | Status: DISCONTINUED | OUTPATIENT
Start: 2025-05-23 | End: 2025-05-23

## 2025-05-23 RX ADMIN — Medication 650 MILLIGRAM(S): at 18:54

## 2025-05-23 RX ADMIN — KETOROLAC TROMETHAMINE 30 MILLIGRAM(S): 30 INJECTION, SOLUTION INTRAMUSCULAR; INTRAVENOUS at 18:54

## 2025-05-23 NOTE — ED ADULT NURSE NOTE - OBJECTIVE STATEMENT
A&OX4, ambulatory, h/o SVT. Patient is coming to ED in regards to mechanical fall while walking his dog. Patient endorses landing on right side injuring right hand. Patient went to urgent care showing fx. patient arrives with splint in place. meds given as per ordered, will continue to monitor.

## 2025-05-23 NOTE — ED PROVIDER NOTE - CLINICAL SUMMARY MEDICAL DECISION MAKING FREE TEXT BOX
30 y/o male w/ no pmh reports today c/o R-hand pain X 11:00 AM today. pt states that he was walking down the stairs holding onto his dogs leash, accidentally lost his balance and fell onto his R-hand w/ his hand clenched. He went to [facility] prior to coming to the ED- diagnosed w/ boxers fx but they did not have fiber glass on site and sent him to the ED to have this done; he is currently in a volar splint .    plan for pt= will repeat xr, pain meds  pt likely will be placed in a ulnar gutter splint and will f/u w/ ortho  he is resting comfortably- will reassess.

## 2025-05-23 NOTE — ED PROVIDER NOTE - PATIENT PORTAL LINK FT
You can access the FollowMyHealth Patient Portal offered by [facility] by registering at the following website: [email or web address]. By joining [employer]’s FollowMyHealth portal, you will also be able to view your health information using other applications (apps) compatible with our system.

## 2025-05-23 NOTE — ED ADULT NURSE REASSESSMENT NOTE - NS ED NURSE REASSESS COMMENT FT1
Report received from ROBERTO Verduzco. Patient is alert and in no signs of acute distress. Patient pending XRs. Comfort measures maintained. Bed in lowest position. Safety maintained.

## 2025-05-23 NOTE — ED PROVIDER NOTE - OBJECTIVE STATEMENT
30 y/o male w/ no pmh reports today c/o R-hand pain X 11:00 AM today. pt states that he was walking down the stairs holding onto his dogs leash, accidentally lost his balance and fell onto his R-hand w/ his hand clenched. He went to [facility] prior to coming to the ED- diagnosed w/ boxers fx but they did not have fiber glass on site and sent him to the ED to have this done; he is currently in a volar splint . Denies; cp, sob, abdominal pain, dizziness, head trauma, loc, visual impairment, fever, chills, nausea or vomiting.

## 2025-05-23 NOTE — ED ADULT TRIAGE NOTE - CHIEF COMPLAINT QUOTE
s/p mechanical slip and fall today while walking dog. denies head-strike, blood thinner, loc. states to have fallen on right hand and went to urgent care and diagnosed with right hand fracture and told to come to the ER for ortho. arrives in splint. able to move fingers. Hx SVT.